# Patient Record
Sex: FEMALE | Race: OTHER | HISPANIC OR LATINO | URBAN - METROPOLITAN AREA
[De-identification: names, ages, dates, MRNs, and addresses within clinical notes are randomized per-mention and may not be internally consistent; named-entity substitution may affect disease eponyms.]

---

## 2021-07-12 ENCOUNTER — EMERGENCY (EMERGENCY)
Facility: HOSPITAL | Age: 41
LOS: 1 days | Discharge: HOME | End: 2021-07-12
Attending: EMERGENCY MEDICINE | Admitting: EMERGENCY MEDICINE
Payer: MEDICAID

## 2021-07-12 VITALS
RESPIRATION RATE: 18 BRPM | HEART RATE: 67 BPM | DIASTOLIC BLOOD PRESSURE: 62 MMHG | WEIGHT: 155.65 LBS | OXYGEN SATURATION: 99 % | SYSTOLIC BLOOD PRESSURE: 116 MMHG | TEMPERATURE: 99 F | HEIGHT: 62.2 IN

## 2021-07-12 VITALS
TEMPERATURE: 98 F | DIASTOLIC BLOOD PRESSURE: 66 MMHG | SYSTOLIC BLOOD PRESSURE: 99 MMHG | OXYGEN SATURATION: 98 % | HEART RATE: 57 BPM | RESPIRATION RATE: 18 BRPM

## 2021-07-12 DIAGNOSIS — N89.8 OTHER SPECIFIED NONINFLAMMATORY DISORDERS OF VAGINA: ICD-10-CM

## 2021-07-12 DIAGNOSIS — R56.9 UNSPECIFIED CONVULSIONS: ICD-10-CM

## 2021-07-12 DIAGNOSIS — G40.909 EPILEPSY, UNSPECIFIED, NOT INTRACTABLE, WITHOUT STATUS EPILEPTICUS: ICD-10-CM

## 2021-07-12 LAB
ALBUMIN SERPL ELPH-MCNC: 4.5 G/DL — SIGNIFICANT CHANGE UP (ref 3.3–5)
ALP SERPL-CCNC: 74 U/L — SIGNIFICANT CHANGE UP (ref 40–120)
ALT FLD-CCNC: 12 U/L — SIGNIFICANT CHANGE UP (ref 10–45)
ANION GAP SERPL CALC-SCNC: 7 MMOL/L — SIGNIFICANT CHANGE UP (ref 5–17)
AST SERPL-CCNC: 17 U/L — SIGNIFICANT CHANGE UP (ref 10–40)
BASOPHILS # BLD AUTO: 0.04 K/UL — SIGNIFICANT CHANGE UP (ref 0–0.2)
BASOPHILS NFR BLD AUTO: 0.6 % — SIGNIFICANT CHANGE UP (ref 0–2)
BILIRUB SERPL-MCNC: 0.2 MG/DL — SIGNIFICANT CHANGE UP (ref 0.2–1.2)
BUN SERPL-MCNC: 10 MG/DL — SIGNIFICANT CHANGE UP (ref 7–23)
CALCIUM SERPL-MCNC: 9.4 MG/DL — SIGNIFICANT CHANGE UP (ref 8.4–10.5)
CHLORIDE SERPL-SCNC: 105 MMOL/L — SIGNIFICANT CHANGE UP (ref 96–108)
CO2 SERPL-SCNC: 30 MMOL/L — SIGNIFICANT CHANGE UP (ref 22–31)
CREAT SERPL-MCNC: 0.84 MG/DL — SIGNIFICANT CHANGE UP (ref 0.5–1.3)
EOSINOPHIL # BLD AUTO: 0.04 K/UL — SIGNIFICANT CHANGE UP (ref 0–0.5)
EOSINOPHIL NFR BLD AUTO: 0.6 % — SIGNIFICANT CHANGE UP (ref 0–6)
GLUCOSE SERPL-MCNC: 121 MG/DL — HIGH (ref 70–99)
HCG SERPL-ACNC: <0 MIU/ML — SIGNIFICANT CHANGE UP
HCT VFR BLD CALC: 42.1 % — SIGNIFICANT CHANGE UP (ref 34.5–45)
HGB BLD-MCNC: 13.7 G/DL — SIGNIFICANT CHANGE UP (ref 11.5–15.5)
IMM GRANULOCYTES NFR BLD AUTO: 0.2 % — SIGNIFICANT CHANGE UP (ref 0–1.5)
LYMPHOCYTES # BLD AUTO: 1.75 K/UL — SIGNIFICANT CHANGE UP (ref 1–3.3)
LYMPHOCYTES # BLD AUTO: 27 % — SIGNIFICANT CHANGE UP (ref 13–44)
MCHC RBC-ENTMCNC: 30.9 PG — SIGNIFICANT CHANGE UP (ref 27–34)
MCHC RBC-ENTMCNC: 32.5 GM/DL — SIGNIFICANT CHANGE UP (ref 32–36)
MCV RBC AUTO: 94.8 FL — SIGNIFICANT CHANGE UP (ref 80–100)
MONOCYTES # BLD AUTO: 0.38 K/UL — SIGNIFICANT CHANGE UP (ref 0–0.9)
MONOCYTES NFR BLD AUTO: 5.9 % — SIGNIFICANT CHANGE UP (ref 2–14)
NEUTROPHILS # BLD AUTO: 4.27 K/UL — SIGNIFICANT CHANGE UP (ref 1.8–7.4)
NEUTROPHILS NFR BLD AUTO: 65.7 % — SIGNIFICANT CHANGE UP (ref 43–77)
NRBC # BLD: 0 /100 WBCS — SIGNIFICANT CHANGE UP (ref 0–0)
PLATELET # BLD AUTO: 254 K/UL — SIGNIFICANT CHANGE UP (ref 150–400)
POTASSIUM SERPL-MCNC: 4.3 MMOL/L — SIGNIFICANT CHANGE UP (ref 3.5–5.3)
POTASSIUM SERPL-SCNC: 4.3 MMOL/L — SIGNIFICANT CHANGE UP (ref 3.5–5.3)
PROT SERPL-MCNC: 7.3 G/DL — SIGNIFICANT CHANGE UP (ref 6–8.3)
RBC # BLD: 4.44 M/UL — SIGNIFICANT CHANGE UP (ref 3.8–5.2)
RBC # FLD: 12.8 % — SIGNIFICANT CHANGE UP (ref 10.3–14.5)
SODIUM SERPL-SCNC: 142 MMOL/L — SIGNIFICANT CHANGE UP (ref 135–145)
WBC # BLD: 6.49 K/UL — SIGNIFICANT CHANGE UP (ref 3.8–10.5)
WBC # FLD AUTO: 6.49 K/UL — SIGNIFICANT CHANGE UP (ref 3.8–10.5)

## 2021-07-12 PROCEDURE — 99283 EMERGENCY DEPT VISIT LOW MDM: CPT

## 2021-07-12 PROCEDURE — 85025 COMPLETE CBC W/AUTO DIFF WBC: CPT

## 2021-07-12 PROCEDURE — 81001 URINALYSIS AUTO W/SCOPE: CPT

## 2021-07-12 PROCEDURE — 84702 CHORIONIC GONADOTROPIN TEST: CPT

## 2021-07-12 PROCEDURE — 99284 EMERGENCY DEPT VISIT MOD MDM: CPT

## 2021-07-12 PROCEDURE — 36415 COLL VENOUS BLD VENIPUNCTURE: CPT

## 2021-07-12 PROCEDURE — 80053 COMPREHEN METABOLIC PANEL: CPT

## 2021-07-12 RX ORDER — FLUCONAZOLE 150 MG/1
150 TABLET ORAL ONCE
Refills: 0 | Status: COMPLETED | OUTPATIENT
Start: 2021-07-12 | End: 2021-07-12

## 2021-07-12 RX ADMIN — FLUCONAZOLE 150 MILLIGRAM(S): 150 TABLET ORAL at 14:31

## 2021-07-12 NOTE — ED PROVIDER NOTE - PATIENT PORTAL LINK FT
You can access the FollowMyHealth Patient Portal offered by [facility] by registering at the following website: [email or web address]. By joining [employer]’s FollowMyHealth portal, you will also be able to view your health information using other applications (apps) compatible with our system.

## 2021-07-12 NOTE — ED ADULT TRIAGE NOTE - CHIEF COMPLAINT QUOTE
Patient reports history of seizures, reports seizure last week, ran out of medications, arrives to ED requesting new neurologist. No neuro deficits observed at time.

## 2021-07-12 NOTE — ED PROVIDER NOTE - PHYSICAL EXAMINATION
VITAL SIGNS: I have reviewed nursing notes and confirm.  CONSTITUTIONAL: Well-developed; well-nourished; in no acute distress.   SKIN:  Warm and dry, no acute rash.   HEAD:  normocephalic, atraumatic.  EYES: PERRL.  EOM intact; conjunctiva and sclera clear.  ENT: No nasal discharge; airway clear.   NECK: Supple; non tender.  CARD: S1, S2 normal; no murmurs, gallops, or rubs. Regular rate and rhythm.   RESP:  Clear to auscultation b/l, no wheezes, rales or rhonchi.  ABD: Normal bowel sounds; soft; non-distended; non-tender; no guarding/rebound.  :  + Mild hyperemia to bilateral labia with scant white discharge, no vaginal lesions noted.    EXT: Normal ROM. No clubbing, cyanosis or edema. 2+ pulses to b/l ue/le.  NEURO: Alert, oriented, grossly unremarkable.  5/5 strength x 4 extremities against gravity and external force.  No drift x 4 extremities.  Sensation intact and symmetric x 4 extremities.  No facial asymmetry.    PSYCH: Cooperative, mood and affect appropriate.

## 2021-07-12 NOTE — ED PROVIDER NOTE - OBJECTIVE STATEMENT
40 year old female with history of seizures x 13 years, typically on anti epileptics but recently ran out presents to ED with concern for breakthrough seizure last week, requesting neurology follow up information.  Patient notes she is here from [facility] for indefinite period of time, and does not have follow up with neurology.  She is unsure of what medication she takes and the dosage.  She also complains of vaginal burning/itching over the past several days not improving with over the counter yeast medication.  Patient denies foul smelling discharge, concern for STDs, stating she has not been sexually active for several months.  Patient denies associated fever, chills, chest pain, shortness of breath, abdominal pain, nausea, emesis, changes to bowel movements, peripheral edema, rashes, recent travel, known sick contacts, peripheral edema or any additional acute complaints or concerns at this time.

## 2021-07-12 NOTE — ED PROVIDER NOTE - NS ED MD EM SELECTION
Normal rate, regular rhythm.  Heart sounds S1, S2.  No murmurs, rubs or gallops.
[id number] Comprehensive

## 2021-07-12 NOTE — ED PROVIDER NOTE - NSFOLLOWUPINSTRUCTIONS_ED_ALL_ED_FT
Please follow up with primary medicine, neurology and gynecology teams (information provided to you) for re evaluation.  Please return to ER immediately should your symptoms worsen or if you have any concern prior to this recommended follow up.          Epilepsia    LO QUE NECESITA SABER:    ¿Qué es la epilepsia?La epilepsia es un trastorno cerebral que provoca convulsiones. También se conoce seven trastorno convulsivo. Skye convulsión significa que un área anormal del cerebro a veces envía ráfagas de actividad eléctrica. Skye convulsión puede afectar judy o ambos lados del cerebro. Dependiendo del tipo de convulsión, puede tener movimientos que no puede controlar, perder la conciencia o mirar fijo hacia adelante. Puede sentirse confundido o cansado después de la convulsión. Skye convulsión puede durar unos segundos o más de 5 minutos. Un defecto de nacimiento, un tumor, un derrame cerebral, demencia, skye lesión o skye infección podrían provocar skye epilepsia. Podría desconocerse la causa de romero epilepsia. Si maday convulsiones no son controladas, podrían representar skye amenaza para la nancy.    ¿Cómo se diagnostica la epilepsia?Romero médico le preguntará acerca de romero condición de margarette y sobre los medicamentos que chandler. Generalmente, se diagnostica epilepsia si tiene al menos 2 convulsiones en el término de 24 horas. También se puede diagnosticar si tiene 1 convulsión, bruce es propenso a tener más. Romero riesgo es mayor si tiene antecedentes familiares de epilepsia. Skye gammagrafía cerebral también puede mostrar signos de epilepsia, que probablemente provocaría otra convulsión. Infórmele al médico qué damon cercanas fueron las convulsiones si tuvo más de skye. Romero médico le pedirá que describa detalladamente cada convulsión. Si es posible, concurra acompañado por skye persona que haya estado presente cuando usted tuvo la convulsión. Es posible que también necesite alguno de los siguientes tratamientos:   •Un EEGregistra la actividad eléctrica de romero cerebro. Se usa para encontrar cambios en los patrones normales de romero actividad cerebral.      •Skye tomografía computarizada (TC) o imágenes por resonancia magnética (IRM)podrían mostrar imágenes que pueden usarse para revisar áreas anormales. Es posible que le administren un medio de contraste que sirve para que el cerebro se observe con mayor claridad en las imágenes. Dígale al médico si usted alguna vez ha tenido skye reacción alérgica al líquido de contraste. No entre a la arpit donde se realiza la resonancia magnética con algo de metal. El metal puede causar lesiones serias. Dígale al médico si usted tiene algo de metal dentro de romero cuerpo o por encima.      •Skye tomografía por emisión de positrones (TEP)se utiliza para observar la actividad en las áreas del cerebro. Le administran un material radioactivo que ayuda a los médicos a observar mejor la actividad.      •Skye tomografía computarizada de emisión monofotónica (SPECT)utiliza material radioactivo para encontrar el lugar donde la convulsión comenzó en el cerebro. Se puede realizar esta tomografía si otras exploraciones no muestran dónde comenzó la convulsión.      ¿Cómo se trata la epilepsia?El objetivo del tratamiento es tratar de detener las convulsiones completamente. Es posible que usted necesite alguno de los siguientes:   •Los medicamentosayudarán a controlar maday convulsiones. Usted podría necesitar medicamento a diario para evitar las convulsiones o carson skye convulsión para detenerla. No deje de ángel romero medicamento a menos que se lo haya indicado romero médico.      •La cirugíapodría ayudarlo a reducir la frecuencia con que suceden las epilepsias si el medicamento no ayuda. Pida más información a romero médico acerca de la cirugía para la epilepsia.      ¿Qué más necesito saber acerca de la epilepsia?  •La muerte súbita sin causa aparente en la epilepsia (SUDEP) es skye complicación poco frecuente de esta enfermedad. En 1 año, 1 de cada 1,000 adultos con epilepsia tendrá esta complicación. El riesgo de sufrir SUDEP aumenta si tiene 3 o más convulsiones tónico-clónicas generalizadas en 1 año. El riesgo también aumenta si tiene convulsiones nocturnas (convulsiones carson el sueño). Después de skye convulsión nocturna, la respiración puede ser superficial.      •Romero médico puede recomendar un cambio en el medicamento para disminuir el número de convulsiones. En el noam de las convulsiones nocturnas, puede recomendar que skye persona duerma cerca suyo. La persona debe tener más de 10 años y, además, estar lo bastante cerca para saber que usted está teniendo skye convulsión.      ¿Qué puedo hacer para evitar skye convulsión?Es posible que no pueda evitar todas las convulsiones. Lo que se detalla a continuación puede ayudarlo a mantener bajo control los factores que pueden provocar el inicio de skye convulsión:   •Darby romero medicamento todos los días a la misma hora.[place] también ayudará a evitar los efectos secundarios del medicamento. Programe skye alarma para que le ayude a acordarse de ángel romero medicamento cada día.      •Controle el estrés.El estrés puede ser un desencadenante de la epilepsia. La actividad física puede ayudar a reducir el estrés. Hable con romero médico sobre la actividad física que es akers para usted. Skye enfermedad puede ser skye forma de estrés. Consuma skye variedad de alimentos saludables y tome suficientes líquidos carson skye enfermedad. Hable con romero médico acerca de otras formas de controlar el estrés.      •Establezca un horario y skye rutina para ir a dormir.La falta de sueño puede provocar skye convulsión. Intente acostarse y levantarse a la misma hora todos los días. Mantenga romero habitación en silencio y a oscuras. Consulte con romero médico si usted tiene dificultad para dormir.      •Limite o no consuma bebidas alcohólicas, según indicaciones.El alcohol puede provocar skye convulsión, especialmente si grisel skye gran cantidad de skye vez. Skye bebida de alcohol equivale a 12 onzas de cerveza, 1½ onzas de licor o 5 onzas de vino. Hable con romero médico acerca de cuál es la cantidad akers de alcohol para usted. Romero médico puede recomendarle que no magan alcohol. Infórmele al médico si necesita ayuda para dejar de beber.      ¿Qué puedo hacer para controlar la epilepsia?  •Lleve un diario de las convulsiones.[place] puede ayudarlo a encontrar los factores desencadenantes y a evitarlos. Escriba las fechas de maday convulsiones, dónde se encontraba y qué estaba haciendo. Incluya cómo se sintió antes y después. Los posibles factores desencadenantes incluyen enfermedades, falta de sueño, cambios hormonales, alcohol, drogas, luces o estrés.      •Anote cualquier aura que tenga antes de skye convulsión.Un aura es un signo que indica que está a punto de tener skye convulsión. Las auras se producen antes de determinados tipos de convulsiones que ocurren solo en skye parte del cerebro. El aura puede ocurrir segundos antes de skye convulsión o hasta skye hora antes. Puede sentir, ed, escuchar u oler algo. Los ejemplos incluyen el aumento de la temperatura en parte de romero cuerpo. Puede ed un destello de blane o escuchar algo. Puede tener ansiedad o sensación de déjà vu. Si tiene un aura, incluya el episodio en el diario de las convulsiones.      •Elabore un plan de cuidado personal.Informe a maday familiares, amigos y compañeros de trabajo acerca de romero epilepsia. Deles instrucciones que expliquen cómo pueden mantenerlo seguro si tiene skye convulsión.      •Busque apoyo.A usted podrían referirlo a un psicólogo o a un trabajador social. Pregunte a romero médico acerca de grupos de apoyo para personas con epilepsia.      •Pregunte qué precauciones de seguridad usted debería ángel.Pregúntele a romero médico si puede conducir. Ammon vez no pueda conducir hasta tanto haya dejado de tener convulsiones carson un tiempo. Tendrá que revisar la gilberto que rige en el lugar donde vive. Además, pregúntele a romero médico si puede nadar y bañarse. Si tiene skye convulsión en el agua, puede ahogarse o sufrir un daño cardíaco o pulmonar potencialmente mortal.      •Lleve skye identificación de alerta médica.Use un brazalete o skye lan de alerta médica o skye tarjeta que indique que tiene epilepsia. Pregúntele a romero médico dónde conseguir estos artículos.  Accesorios de alerta médica           ¿Cómo pueden otras personas mantenerme seguro carson skye convulsión?Dé las siguientes instrucciones a maday familiares, amigos y compañeros de trabajo:   •No entre en pánico.      •No me sujeten ni me pongan ningún objeto en la boca.      •Guíenme cuidadosamente al suelo o a skye superficie suave.      •Acuéstenme de lado para que no trague mi propia saliva o vómito.  Primeros auxilios: Convulsiones (ADULTOS)           •Protéjanme de lesiones. Quiten los elementos cortantes o duros de mis proximidades o protejan mi madina.      •Aflójenme la ropa alrededor de la madina y el kei.      •Tomen el tiempo que song mis convulsiones. Llame al 911 si mis convulsiones song más de 5 minutos o si tengo skye segunda convulsión.      •Permanezcan conmigo hasta que termine la convulsión. Déjenme descansar hasta que esté completamente despierto.      •Realicen reanimación cardiopulmonar si dejo de respirar o si no pueden sentir mi pulso.      •No me dé nada para comer o ángel hasta que esté completamente despierto.      Llame al número local de emergencias (911 en los [place]), o pídale a alguien que llame si ocurre lo siguiente:  •Romero convulsión dura más de 5 minutos.      •Tiene dificultad para respirar después de skye convulsión.      •Tiene diabetes o está embarazada, y tiene skye convulsión.      •Tiene skye convulsión en el agua, seven skye piscina o bañera.      ¿Cuándo tito llamar a mi médico?  •Tiene skye segunda convulsión en el término de 24 horas después de la primera.      •Usted sufre skye lesión carson skye convulsión.      •Usted siente que no puede lidiar con romero condición.      •Las convulsiones empiezan a ocurrir con mayor frecuencia.      •Se siente confundido carson más tiempo de lo habitual después de skye convulsión.      •Usted está planeando quedar embarazada o está embarazada actualmente.      •Usted tiene preguntas o inquietudes acerca de romero condición o cuidado.      ACUERDOS SOBRE ROMERO CUIDADO:    Usted tiene el derecho de ayudar a planear romero cuidado. Aprenda todo lo que pueda sobre romero condición y seven darle tratamiento. Discuta maday opciones de tratamiento con maday médicos para decidir el cuidado que usted desea recibir. Usted siempre tiene el derecho de rechazar el tratamiento.       © Copyright [employer] 2021           back to top                          © Copyright [employer] 2021

## 2021-07-12 NOTE — ED PROVIDER NOTE - NS ED ROS FT
Constitutional: No fever or chills.   Eyes: No pain, blurry vision, or discharge.  ENMT: No hearing changes, pain, discharge or infections. No neck pain or stiffness.  Cardiac: No chest pain, SOB or edema. No chest pain with exertion.  Respiratory: No cough or respiratory distress. No hemoptysis. No history of asthma or RAD.  GI: No nausea, vomiting, diarrhea or abdominal pain.  : + Vaginal irritation.  No dysuria or frequency.  MS: No myalgia, muscle weakness, joint pain or back pain.  Neuro: + History of seizures.  No headache or weakness. No LOC.  Skin: No skin rash.   Endocrine: No history of thyroid disease or diabetes.  Except as documented in the HPI, all other systems are negative.

## 2021-07-12 NOTE — ED ADULT NURSE NOTE - OBJECTIVE STATEMENT
Pt presents to ED c/o seizures. Hx of seizures in the past, supposed to be taking carbamazepine. Reports ran out of medication. Reports last seizure 1 week ago. Pt A&Ox4, ambulatory with steady gait, speaking in clear/full sentences, no acute distress, vital signs stable. Large bore IV access obtained, suction at bedside.
nonverbal cues (demo/gestures)/verbal cues/1 person assist

## 2021-07-12 NOTE — ED PROVIDER NOTE - CARE PROVIDER_API CALL
Sandra Nguyễn)  EEGEpilepsy; Neurology  [address], [address] [place] [address]  Phone: [phone]  Fax: [phone]  Follow Up Time:     Phillip Rizo)  Obstetrics and Gynecology  [address] [place] [address]  Phone: [phone]  Fax: [phone]  Follow Up Time:     Ricco Rice  OBSTETRICS AND GYNECOLOGY  [address] [place] [address]  Phone: [phone]  Fax: [phone]  Follow Up Time:

## 2021-07-12 NOTE — ED PROVIDER NOTE - CARE PROVIDERS DIRECT ADDRESSES
[email or web address]

## 2021-07-12 NOTE — ED PROVIDER NOTE - CLINICAL SUMMARY MEDICAL DECISION MAKING FREE TEXT BOX
Patient in ED w concern for vaginal irritation not improving with OTC yeast medication and request for neurology follow up information as [place] out of her antiepileptics.  She is unsure of exactly what medication and dose she is prescribed.  She is well appearing in ED, denies any seizure since last week when she experienced one seizure in seated position on her bed without fall/trauma.  UA is sent.  Exam consistent with possible yeast infection.  Will treat with oral diflucan and plan for discharge with PCP, neurology and gyn follow up information provided.  Patient is advised to follow up with these primary teams in 1-2 days without fail.  Patient instructed to return to ED immediately should their symptoms worsen or if there is any concern prior to the recommended PCP follow up.  Patient is aware of plan and verbalizes their understanding.  Will discharge home at this time.

## 2021-07-12 NOTE — ED PROVIDER NOTE - PROVIDER TOKENS
PROVIDER:[TOKEN:[[id number]]],PROVIDER:[TOKEN:[[id number]]],PROVIDER:[TOKEN:[[id number]]]

## 2021-07-20 PROBLEM — Z00.00 ENCOUNTER FOR PREVENTIVE HEALTH EXAMINATION: Status: ACTIVE | Noted: 2021-07-20

## 2023-05-17 NOTE — ED ADULT TRIAGE NOTE - AS HEIGHT TYPE
stated Birth Control Pills Pregnancy And Lactation Text: This medication should be avoided if pregnant and for the first 30 days post-partum.